# Patient Record
Sex: FEMALE
[De-identification: names, ages, dates, MRNs, and addresses within clinical notes are randomized per-mention and may not be internally consistent; named-entity substitution may affect disease eponyms.]

---

## 2019-03-04 ENCOUNTER — HOSPITAL ENCOUNTER (EMERGENCY)
Dept: HOSPITAL 93 - ER | Age: 24
Discharge: HOME | End: 2019-03-04
Payer: COMMERCIAL

## 2019-03-04 VITALS — WEIGHT: 196 LBS | BODY MASS INDEX: 34.73 KG/M2 | HEIGHT: 63 IN

## 2019-03-04 DIAGNOSIS — R10.2: Primary | ICD-10-CM

## 2019-04-30 ENCOUNTER — HOSPITAL ENCOUNTER (EMERGENCY)
Dept: HOSPITAL 93 - ER | Age: 24
Discharge: HOME | End: 2019-04-30
Payer: COMMERCIAL

## 2019-04-30 VITALS — BODY MASS INDEX: 34.73 KG/M2 | WEIGHT: 196 LBS | HEIGHT: 63 IN

## 2019-04-30 DIAGNOSIS — N39.0: Primary | ICD-10-CM

## 2020-08-26 ENCOUNTER — HOSPITAL ENCOUNTER (EMERGENCY)
Dept: HOSPITAL 93 - ER | Age: 25
LOS: 1 days | Discharge: HOME | End: 2020-08-27
Payer: COMMERCIAL

## 2020-08-26 VITALS — HEIGHT: 63 IN | WEIGHT: 200 LBS | BODY MASS INDEX: 35.44 KG/M2

## 2020-08-26 DIAGNOSIS — O21.0: Primary | ICD-10-CM

## 2020-08-26 DIAGNOSIS — Z3A.09: ICD-10-CM

## 2020-08-26 DIAGNOSIS — B96.29: ICD-10-CM

## 2020-08-26 DIAGNOSIS — O23.31: ICD-10-CM

## 2020-09-02 ENCOUNTER — HOSPITAL ENCOUNTER (EMERGENCY)
Dept: HOSPITAL 93 - ER | Age: 25
Discharge: HOME | End: 2020-09-02
Payer: COMMERCIAL

## 2020-09-02 ENCOUNTER — HOSPITAL ENCOUNTER (INPATIENT)
Dept: HOSPITAL 93 - OB/GYN | Age: 25
LOS: 3 days | Discharge: HOME | DRG: 833 | End: 2020-09-05
Attending: OBSTETRICS & GYNECOLOGY | Admitting: OBSTETRICS & GYNECOLOGY
Payer: COMMERCIAL

## 2020-09-02 VITALS — WEIGHT: 194 LBS | HEIGHT: 63 IN | BODY MASS INDEX: 34.38 KG/M2

## 2020-09-02 VITALS — HEIGHT: 63 IN | WEIGHT: 193 LBS | BODY MASS INDEX: 34.2 KG/M2

## 2020-09-02 DIAGNOSIS — R11.2: ICD-10-CM

## 2020-09-02 DIAGNOSIS — Z03.818: ICD-10-CM

## 2020-09-02 DIAGNOSIS — O23.31: ICD-10-CM

## 2020-09-02 DIAGNOSIS — Z3A.10: ICD-10-CM

## 2020-09-02 DIAGNOSIS — O21.0: Primary | ICD-10-CM

## 2020-09-02 DIAGNOSIS — O26.891: Primary | ICD-10-CM

## 2020-09-12 ENCOUNTER — HOSPITAL ENCOUNTER (EMERGENCY)
Dept: HOSPITAL 93 - ER | Age: 25
Discharge: HOME | End: 2020-09-12
Payer: COMMERCIAL

## 2020-09-12 VITALS — WEIGHT: 193 LBS | BODY MASS INDEX: 34.2 KG/M2 | HEIGHT: 63 IN

## 2020-09-12 DIAGNOSIS — Z3A.11: ICD-10-CM

## 2020-09-12 DIAGNOSIS — O21.0: Primary | ICD-10-CM

## 2020-09-21 ENCOUNTER — HOSPITAL ENCOUNTER (INPATIENT)
Dept: HOSPITAL 93 - ER | Age: 25
LOS: 5 days | Discharge: HOME | DRG: 833 | End: 2020-09-26
Attending: OBSTETRICS & GYNECOLOGY | Admitting: OBSTETRICS & GYNECOLOGY
Payer: COMMERCIAL

## 2020-09-21 VITALS — HEIGHT: 63 IN | BODY MASS INDEX: 33.31 KG/M2 | WEIGHT: 188 LBS

## 2020-09-21 DIAGNOSIS — Z20.828: ICD-10-CM

## 2020-09-21 DIAGNOSIS — O21.1: Primary | ICD-10-CM

## 2020-09-21 DIAGNOSIS — Z3A.12: ICD-10-CM

## 2020-09-21 NOTE — NUR
PTE FEMENINA ALERTA Y ORIENTADA EN LAS DENIZ ESFERAS ES EVLAUADA POR
. SE ORIENTA PTE SOBRE ORDENES DE TRATAMIENTO REFIERE COMPRENDER. SE
COLECTAN MUESTRAS DE LABORATORIO Y SE CANALAIZA VENA, BAJO MEDIDAS ASEPTICAS.
SE ADMINISTRAN MEDICAMENTOS, BAJO MEDIDAS ASEPTICAS, OFELIA ORDEN MEDICA. SE
NOTIFICA A PERSONAL DE SONOSGRAFIA PARA ESTUDIO.

## 2020-10-04 ENCOUNTER — HOSPITAL ENCOUNTER (INPATIENT)
Dept: HOSPITAL 93 - ER | Age: 25
LOS: 4 days | DRG: 833 | End: 2020-10-08
Attending: OBSTETRICS & GYNECOLOGY | Admitting: OBSTETRICS & GYNECOLOGY
Payer: COMMERCIAL

## 2020-10-04 VITALS — WEIGHT: 174 LBS | HEIGHT: 63 IN | BODY MASS INDEX: 30.83 KG/M2

## 2020-10-04 DIAGNOSIS — O21.0: Primary | ICD-10-CM

## 2020-10-04 PROCEDURE — 4A1HXCZ MONITORING OF PRODUCTS OF CONCEPTION, CARDIAC RATE, EXTERNAL APPROACH: ICD-10-PCS | Performed by: OBSTETRICS & GYNECOLOGY

## 2020-11-09 ENCOUNTER — HOSPITAL ENCOUNTER (OUTPATIENT)
Dept: HOSPITAL 93 - PRENATAL | Age: 25
Discharge: HOME | End: 2020-11-09
Attending: OBSTETRICS & GYNECOLOGY
Payer: COMMERCIAL

## 2020-11-09 DIAGNOSIS — O98.512: ICD-10-CM

## 2020-11-09 DIAGNOSIS — Z36.89: ICD-10-CM

## 2020-11-09 DIAGNOSIS — O35.0XX1: ICD-10-CM

## 2020-11-09 DIAGNOSIS — O35.3XX1: ICD-10-CM

## 2020-11-09 DIAGNOSIS — Z3A.19: ICD-10-CM

## 2020-11-09 DIAGNOSIS — O99.891: Primary | ICD-10-CM
